# Patient Record
Sex: MALE | Race: OTHER
[De-identification: names, ages, dates, MRNs, and addresses within clinical notes are randomized per-mention and may not be internally consistent; named-entity substitution may affect disease eponyms.]

---

## 2022-02-20 ENCOUNTER — HOSPITAL ENCOUNTER (EMERGENCY)
Dept: HOSPITAL 15 - ER | Age: 67
Discharge: HOME | End: 2022-02-20
Payer: COMMERCIAL

## 2022-02-20 VITALS — DIASTOLIC BLOOD PRESSURE: 71 MMHG | SYSTOLIC BLOOD PRESSURE: 109 MMHG

## 2022-02-20 VITALS — BODY MASS INDEX: 24.11 KG/M2 | HEIGHT: 66 IN | WEIGHT: 150 LBS

## 2022-02-20 DIAGNOSIS — E78.5: ICD-10-CM

## 2022-02-20 DIAGNOSIS — R41.82: ICD-10-CM

## 2022-02-20 DIAGNOSIS — R55: Primary | ICD-10-CM

## 2022-02-20 DIAGNOSIS — I10: ICD-10-CM

## 2022-02-20 DIAGNOSIS — F17.210: ICD-10-CM

## 2022-02-20 DIAGNOSIS — R42: ICD-10-CM

## 2022-02-20 LAB
ALBUMIN SERPL-MCNC: 3.6 G/DL (ref 3.4–5)
ALP SERPL-CCNC: 63 U/L (ref 45–117)
ALT SERPL-CCNC: 28 U/L (ref 16–61)
ANION GAP SERPL CALCULATED.3IONS-SCNC: 5 MMOL/L (ref 5–15)
BILIRUB SERPL-MCNC: 0.6 MG/DL (ref 0.2–1)
BUN SERPL-MCNC: 20 MG/DL (ref 7–18)
BUN/CREAT SERPL: 18.2
CALCIUM SERPL-MCNC: 8.5 MG/DL (ref 8.5–10.1)
CHLORIDE SERPL-SCNC: 106 MMOL/L (ref 98–107)
CO2 SERPL-SCNC: 24 MMOL/L (ref 21–32)
GLUCOSE SERPL-MCNC: 90 MG/DL (ref 74–106)
HCT VFR BLD AUTO: 38.1 % (ref 41–53)
HGB BLD-MCNC: 13.3 G/DL (ref 13.5–17.5)
MCH RBC QN AUTO: 29.2 PG (ref 28–32)
MCV RBC AUTO: 83.6 FL (ref 80–100)
NRBC BLD QL AUTO: 0.1 %
POTASSIUM SERPL-SCNC: 4.1 MMOL/L (ref 3.5–5.1)
PROT SERPL-MCNC: 6.9 G/DL (ref 6.4–8.2)
SODIUM SERPL-SCNC: 135 MMOL/L (ref 136–145)

## 2022-02-20 PROCEDURE — 85025 COMPLETE CBC W/AUTO DIFF WBC: CPT

## 2022-02-20 PROCEDURE — 84484 ASSAY OF TROPONIN QUANT: CPT

## 2022-02-20 PROCEDURE — 93005 ELECTROCARDIOGRAM TRACING: CPT

## 2022-02-20 PROCEDURE — 96365 THER/PROPH/DIAG IV INF INIT: CPT

## 2022-02-20 PROCEDURE — 71045 X-RAY EXAM CHEST 1 VIEW: CPT

## 2022-02-20 PROCEDURE — 70450 CT HEAD/BRAIN W/O DYE: CPT

## 2022-02-20 PROCEDURE — 80053 COMPREHEN METABOLIC PANEL: CPT

## 2022-02-20 PROCEDURE — 99285 EMERGENCY DEPT VISIT HI MDM: CPT

## 2022-02-20 PROCEDURE — 74176 CT ABD & PELVIS W/O CONTRAST: CPT

## 2022-02-20 PROCEDURE — 96361 HYDRATE IV INFUSION ADD-ON: CPT

## 2022-02-20 PROCEDURE — 36415 COLL VENOUS BLD VENIPUNCTURE: CPT

## 2023-08-07 ENCOUNTER — HOSPITAL ENCOUNTER (INPATIENT)
Dept: HOSPITAL 15 - ER | Age: 68
LOS: 3 days | Discharge: HOME | DRG: 174 | End: 2023-08-10
Admitting: INTERNAL MEDICINE
Payer: MEDICAID

## 2023-08-07 VITALS — HEIGHT: 67 IN | BODY MASS INDEX: 28.48 KG/M2 | WEIGHT: 181.44 LBS

## 2023-08-07 VITALS — RESPIRATION RATE: 16 BRPM | HEART RATE: 64 BPM | OXYGEN SATURATION: 97 %

## 2023-08-07 DIAGNOSIS — J84.10: ICD-10-CM

## 2023-08-07 DIAGNOSIS — I21.4: Primary | ICD-10-CM

## 2023-08-07 DIAGNOSIS — I11.0: ICD-10-CM

## 2023-08-07 DIAGNOSIS — Z95.5: ICD-10-CM

## 2023-08-07 DIAGNOSIS — Z87.891: ICD-10-CM

## 2023-08-07 DIAGNOSIS — I50.30: ICD-10-CM

## 2023-08-07 DIAGNOSIS — Z90.49: ICD-10-CM

## 2023-08-07 DIAGNOSIS — I25.10: ICD-10-CM

## 2023-08-07 DIAGNOSIS — E78.5: ICD-10-CM

## 2023-08-07 LAB
ALBUMIN SERPL-MCNC: 3.8 G/DL (ref 3.4–5)
ALP SERPL-CCNC: 74 U/L (ref 45–117)
ALT SERPL-CCNC: 39 U/L (ref 16–61)
ANION GAP SERPL CALCULATED.3IONS-SCNC: 5 MMOL/L (ref 5–15)
APTT PPP: 29.3 SEC (ref 24.5–34.5)
BILIRUB SERPL-MCNC: 0.5 MG/DL (ref 0.2–1)
BUN SERPL-MCNC: 19 MG/DL (ref 7–18)
BUN/CREAT SERPL: 16.5 (ref 10–20)
CALCIUM SERPL-MCNC: 9 MG/DL (ref 8.5–10.1)
CHLORIDE SERPL-SCNC: 107 MMOL/L (ref 98–107)
CHOLEST SERPL-MCNC: 200 MG/DL (ref ?–200)
CO2 SERPL-SCNC: 26 MMOL/L (ref 21–32)
GLUCOSE SERPL-MCNC: 97 MG/DL (ref 74–106)
HCT VFR BLD AUTO: 42.4 % (ref 41–53)
HDLC SERPL-MCNC: 44 MG/DL (ref 40–59)
HGB BLD-MCNC: 14.4 G/DL (ref 13.5–17.5)
INR PPP: 1.06 (ref 0.9–1.15)
MCH RBC QN AUTO: 28.5 PG (ref 28–32)
MCV RBC AUTO: 83.7 FL (ref 80–100)
NRBC BLD QL AUTO: 0 %
POTASSIUM SERPL-SCNC: 4.2 MMOL/L (ref 3.5–5.1)
PROT SERPL-MCNC: 7.5 G/DL (ref 6.4–8.2)
PROTHROMBIN TIME: 11.1 SEC (ref 9.3–11.8)
SODIUM SERPL-SCNC: 138 MMOL/L (ref 136–145)
TRIGL SERPL-MCNC: 167 MG/DL (ref ?–150)

## 2023-08-07 PROCEDURE — 36415 COLL VENOUS BLD VENIPUNCTURE: CPT

## 2023-08-07 PROCEDURE — 84484 ASSAY OF TROPONIN QUANT: CPT

## 2023-08-07 PROCEDURE — 85025 COMPLETE CBC W/AUTO DIFF WBC: CPT

## 2023-08-07 PROCEDURE — 80061 LIPID PANEL: CPT

## 2023-08-07 PROCEDURE — 80053 COMPREHEN METABOLIC PANEL: CPT

## 2023-08-07 PROCEDURE — 96372 THER/PROPH/DIAG INJ SC/IM: CPT

## 2023-08-07 PROCEDURE — 71045 X-RAY EXAM CHEST 1 VIEW: CPT

## 2023-08-07 PROCEDURE — 85610 PROTHROMBIN TIME: CPT

## 2023-08-07 PROCEDURE — 93306 TTE W/DOPPLER COMPLETE: CPT

## 2023-08-07 PROCEDURE — 81001 URINALYSIS AUTO W/SCOPE: CPT

## 2023-08-07 PROCEDURE — 99291 CRITICAL CARE FIRST HOUR: CPT

## 2023-08-07 PROCEDURE — 83880 ASSAY OF NATRIURETIC PEPTIDE: CPT

## 2023-08-07 PROCEDURE — 85730 THROMBOPLASTIN TIME PARTIAL: CPT

## 2023-08-07 PROCEDURE — 83036 HEMOGLOBIN GLYCOSYLATED A1C: CPT

## 2023-08-07 PROCEDURE — 99152 MOD SED SAME PHYS/QHP 5/>YRS: CPT

## 2023-08-07 PROCEDURE — 83735 ASSAY OF MAGNESIUM: CPT

## 2023-08-07 PROCEDURE — 84443 ASSAY THYROID STIM HORMONE: CPT

## 2023-08-07 PROCEDURE — 93005 ELECTROCARDIOGRAM TRACING: CPT

## 2023-08-07 PROCEDURE — 80048 BASIC METABOLIC PNL TOTAL CA: CPT

## 2023-08-07 RX ADMIN — ENOXAPARIN SODIUM SCH MG: 100 INJECTION SUBCUTANEOUS at 20:00

## 2023-08-07 RX ADMIN — SODIUM CHLORIDE SCH MLS/HR: 0.9 INJECTION, SOLUTION INTRAVENOUS at 20:28

## 2023-08-08 VITALS
DIASTOLIC BLOOD PRESSURE: 84 MMHG | SYSTOLIC BLOOD PRESSURE: 148 MMHG | OXYGEN SATURATION: 99 % | RESPIRATION RATE: 18 BRPM | HEART RATE: 68 BPM

## 2023-08-08 VITALS
RESPIRATION RATE: 13 BRPM | OXYGEN SATURATION: 95 % | HEART RATE: 68 BPM | DIASTOLIC BLOOD PRESSURE: 86 MMHG | SYSTOLIC BLOOD PRESSURE: 155 MMHG

## 2023-08-08 VITALS
OXYGEN SATURATION: 96 % | HEART RATE: 70 BPM | SYSTOLIC BLOOD PRESSURE: 129 MMHG | DIASTOLIC BLOOD PRESSURE: 75 MMHG | RESPIRATION RATE: 20 BRPM

## 2023-08-08 VITALS
SYSTOLIC BLOOD PRESSURE: 113 MMHG | HEART RATE: 70 BPM | DIASTOLIC BLOOD PRESSURE: 60 MMHG | OXYGEN SATURATION: 99 % | RESPIRATION RATE: 17 BRPM | TEMPERATURE: 98 F

## 2023-08-08 VITALS
HEART RATE: 67 BPM | RESPIRATION RATE: 15 BRPM | SYSTOLIC BLOOD PRESSURE: 141 MMHG | OXYGEN SATURATION: 96 % | DIASTOLIC BLOOD PRESSURE: 78 MMHG

## 2023-08-08 VITALS
HEART RATE: 65 BPM | TEMPERATURE: 97.8 F | OXYGEN SATURATION: 95 % | RESPIRATION RATE: 12 BRPM | SYSTOLIC BLOOD PRESSURE: 150 MMHG | DIASTOLIC BLOOD PRESSURE: 81 MMHG

## 2023-08-08 VITALS — HEART RATE: 69 BPM | OXYGEN SATURATION: 98 % | RESPIRATION RATE: 18 BRPM

## 2023-08-08 VITALS
DIASTOLIC BLOOD PRESSURE: 88 MMHG | HEART RATE: 66 BPM | SYSTOLIC BLOOD PRESSURE: 160 MMHG | RESPIRATION RATE: 12 BRPM | OXYGEN SATURATION: 95 %

## 2023-08-08 VITALS — HEART RATE: 71 BPM | RESPIRATION RATE: 16 BRPM | OXYGEN SATURATION: 93 %

## 2023-08-08 LAB
ALBUMIN SERPL-MCNC: 3.3 G/DL (ref 3.4–5)
ALP SERPL-CCNC: 64 U/L (ref 45–117)
ALT SERPL-CCNC: 35 U/L (ref 16–61)
ANION GAP SERPL CALCULATED.3IONS-SCNC: 5 MMOL/L (ref 5–15)
BILIRUB SERPL-MCNC: 0.4 MG/DL (ref 0.2–1)
BUN SERPL-MCNC: 21 MG/DL (ref 7–18)
BUN/CREAT SERPL: 18.4 (ref 10–20)
CALCIUM SERPL-MCNC: 8.7 MG/DL (ref 8.5–10.1)
CHLORIDE SERPL-SCNC: 108 MMOL/L (ref 98–107)
CO2 SERPL-SCNC: 25 MMOL/L (ref 21–32)
GLUCOSE SERPL-MCNC: 94 MG/DL (ref 74–106)
HCT VFR BLD AUTO: 40 % (ref 41–53)
HGB BLD-MCNC: 13.7 G/DL (ref 13.5–17.5)
MCH RBC QN AUTO: 28.6 PG (ref 28–32)
MCV RBC AUTO: 83.5 FL (ref 80–100)
NRBC BLD QL AUTO: 0.1 %
POTASSIUM SERPL-SCNC: 4 MMOL/L (ref 3.5–5.1)
PROT SERPL-MCNC: 7.1 G/DL (ref 6.4–8.2)
SODIUM SERPL-SCNC: 138 MMOL/L (ref 136–145)

## 2023-08-08 PROCEDURE — B240ZZ3 ULTRASONOGRAPHY OF SINGLE CORONARY ARTERY, INTRAVASCULAR: ICD-10-PCS | Performed by: STUDENT IN AN ORGANIZED HEALTH CARE EDUCATION/TRAINING PROGRAM

## 2023-08-08 PROCEDURE — B211YZZ FLUOROSCOPY OF MULTIPLE CORONARY ARTERIES USING OTHER CONTRAST: ICD-10-PCS | Performed by: STUDENT IN AN ORGANIZED HEALTH CARE EDUCATION/TRAINING PROGRAM

## 2023-08-08 PROCEDURE — 027034Z DILATION OF CORONARY ARTERY, ONE ARTERY WITH DRUG-ELUTING INTRALUMINAL DEVICE, PERCUTANEOUS APPROACH: ICD-10-PCS | Performed by: STUDENT IN AN ORGANIZED HEALTH CARE EDUCATION/TRAINING PROGRAM

## 2023-08-08 PROCEDURE — 4A023N7 MEASUREMENT OF CARDIAC SAMPLING AND PRESSURE, LEFT HEART, PERCUTANEOUS APPROACH: ICD-10-PCS | Performed by: STUDENT IN AN ORGANIZED HEALTH CARE EDUCATION/TRAINING PROGRAM

## 2023-08-08 PROCEDURE — 4A033BC MEASUREMENT OF ARTERIAL PRESSURE, CORONARY, PERCUTANEOUS APPROACH: ICD-10-PCS | Performed by: STUDENT IN AN ORGANIZED HEALTH CARE EDUCATION/TRAINING PROGRAM

## 2023-08-08 PROCEDURE — B215YZZ FLUOROSCOPY OF LEFT HEART USING OTHER CONTRAST: ICD-10-PCS | Performed by: STUDENT IN AN ORGANIZED HEALTH CARE EDUCATION/TRAINING PROGRAM

## 2023-08-08 RX ADMIN — SODIUM CHLORIDE SCH MLS/HR: 0.9 INJECTION, SOLUTION INTRAVENOUS at 21:20

## 2023-08-08 RX ADMIN — TICAGRELOR SCH MG: 90 TABLET ORAL at 21:18

## 2023-08-08 RX ADMIN — ATORVASTATIN CALCIUM SCH MG: 20 TABLET, FILM COATED ORAL at 21:18

## 2023-08-08 RX ADMIN — SODIUM CHLORIDE SCH MLS/HR: 0.9 INJECTION, SOLUTION INTRAVENOUS at 08:27

## 2023-08-08 RX ADMIN — ENOXAPARIN SODIUM SCH MG: 100 INJECTION SUBCUTANEOUS at 09:02

## 2023-08-09 VITALS
HEART RATE: 77 BPM | SYSTOLIC BLOOD PRESSURE: 119 MMHG | TEMPERATURE: 98 F | OXYGEN SATURATION: 92 % | RESPIRATION RATE: 16 BRPM | DIASTOLIC BLOOD PRESSURE: 68 MMHG

## 2023-08-09 VITALS — RESPIRATION RATE: 18 BRPM | HEART RATE: 77 BPM | OXYGEN SATURATION: 97 %

## 2023-08-09 VITALS
OXYGEN SATURATION: 97 % | HEART RATE: 77 BPM | RESPIRATION RATE: 19 BRPM | TEMPERATURE: 97.8 F | SYSTOLIC BLOOD PRESSURE: 112 MMHG | DIASTOLIC BLOOD PRESSURE: 67 MMHG

## 2023-08-09 VITALS
SYSTOLIC BLOOD PRESSURE: 119 MMHG | TEMPERATURE: 98 F | OXYGEN SATURATION: 92 % | HEART RATE: 77 BPM | DIASTOLIC BLOOD PRESSURE: 68 MMHG | RESPIRATION RATE: 16 BRPM

## 2023-08-09 VITALS
SYSTOLIC BLOOD PRESSURE: 118 MMHG | RESPIRATION RATE: 17 BRPM | OXYGEN SATURATION: 99 % | HEART RATE: 73 BPM | DIASTOLIC BLOOD PRESSURE: 71 MMHG | TEMPERATURE: 98.1 F

## 2023-08-09 VITALS — OXYGEN SATURATION: 97 % | HEART RATE: 73 BPM | RESPIRATION RATE: 18 BRPM

## 2023-08-09 LAB
ANION GAP SERPL CALCULATED.3IONS-SCNC: 5 MMOL/L (ref 5–15)
BUN SERPL-MCNC: 16 MG/DL (ref 7–18)
BUN/CREAT SERPL: 14.5 (ref 10–20)
CALCIUM SERPL-MCNC: 8.2 MG/DL (ref 8.5–10.1)
CHLORIDE SERPL-SCNC: 108 MMOL/L (ref 98–107)
CO2 SERPL-SCNC: 26 MMOL/L (ref 21–32)
GLUCOSE SERPL-MCNC: 80 MG/DL (ref 74–106)
HCT VFR BLD AUTO: 38.2 % (ref 41–53)
HGB BLD-MCNC: 12.9 G/DL (ref 13.5–17.5)
MCH RBC QN AUTO: 28.6 PG (ref 28–32)
MCV RBC AUTO: 84.8 FL (ref 80–100)
NRBC BLD QL AUTO: 0 %
POTASSIUM SERPL-SCNC: 4 MMOL/L (ref 3.5–5.1)
SODIUM SERPL-SCNC: 139 MMOL/L (ref 136–145)

## 2023-08-09 RX ADMIN — TICAGRELOR SCH MG: 90 TABLET ORAL at 10:11

## 2023-08-09 RX ADMIN — ACETAMINOPHEN PRN MG: 325 TABLET ORAL at 21:37

## 2023-08-09 RX ADMIN — ACETAMINOPHEN PRN MG: 325 TABLET ORAL at 12:14

## 2023-08-09 RX ADMIN — METOPROLOL SUCCINATE SCH MG: 50 TABLET, EXTENDED RELEASE ORAL at 10:07

## 2023-08-09 RX ADMIN — TICAGRELOR SCH MG: 90 TABLET ORAL at 21:37

## 2023-08-09 RX ADMIN — ATORVASTATIN CALCIUM SCH MG: 20 TABLET, FILM COATED ORAL at 21:37

## 2023-08-10 VITALS
RESPIRATION RATE: 19 BRPM | TEMPERATURE: 98 F | HEART RATE: 73 BPM | OXYGEN SATURATION: 94 % | DIASTOLIC BLOOD PRESSURE: 72 MMHG | SYSTOLIC BLOOD PRESSURE: 123 MMHG

## 2023-08-10 VITALS
RESPIRATION RATE: 18 BRPM | DIASTOLIC BLOOD PRESSURE: 69 MMHG | TEMPERATURE: 97.9 F | SYSTOLIC BLOOD PRESSURE: 120 MMHG | OXYGEN SATURATION: 96 % | HEART RATE: 74 BPM

## 2023-08-10 VITALS — RESPIRATION RATE: 19 BRPM | HEART RATE: 73 BPM | OXYGEN SATURATION: 94 %

## 2023-08-10 RX ADMIN — METOPROLOL SUCCINATE SCH MG: 50 TABLET, EXTENDED RELEASE ORAL at 09:59

## 2023-08-10 RX ADMIN — TICAGRELOR SCH MG: 90 TABLET ORAL at 09:59

## 2023-09-07 ENCOUNTER — HOSPITAL ENCOUNTER (INPATIENT)
Dept: HOSPITAL 15 - ER | Age: 68
Discharge: LEFT BEFORE DISCHARGE | DRG: 198 | End: 2023-09-07
Payer: MEDICAID

## 2023-09-07 VITALS — WEIGHT: 165.35 LBS | BODY MASS INDEX: 24.49 KG/M2 | HEIGHT: 69 IN

## 2023-09-07 VITALS
OXYGEN SATURATION: 97 % | RESPIRATION RATE: 16 BRPM | SYSTOLIC BLOOD PRESSURE: 123 MMHG | DIASTOLIC BLOOD PRESSURE: 81 MMHG | HEART RATE: 71 BPM

## 2023-09-07 DIAGNOSIS — Z87.891: ICD-10-CM

## 2023-09-07 DIAGNOSIS — N40.0: ICD-10-CM

## 2023-09-07 DIAGNOSIS — I24.9: Primary | ICD-10-CM

## 2023-09-07 DIAGNOSIS — Z95.5: ICD-10-CM

## 2023-09-07 DIAGNOSIS — I10: ICD-10-CM

## 2023-09-07 DIAGNOSIS — E78.5: ICD-10-CM

## 2023-09-07 LAB
ALBUMIN SERPL-MCNC: 4.6 G/DL (ref 3.2–4.8)
ALP SERPL-CCNC: 81 U/L (ref 46–116)
ALT SERPL-CCNC: 34 U/L (ref 7–40)
ANION GAP SERPL CALCULATED.3IONS-SCNC: 7 MMOL/L (ref 5–15)
BILIRUB SERPL-MCNC: 0.4 MG/DL (ref 0.2–1)
BUN SERPL-MCNC: 13 MG/DL (ref 9–23)
BUN/CREAT SERPL: 12.7 (ref 10–20)
CALCIUM SERPL-MCNC: 9.9 MG/DL (ref 8.5–10.1)
CHLORIDE SERPL-SCNC: 105 MMOL/L (ref 98–107)
CO2 SERPL-SCNC: 26 MMOL/L (ref 20–30)
GLUCOSE SERPL-MCNC: 115 MG/DL (ref 74–106)
HCT VFR BLD AUTO: 41.2 % (ref 41–53)
HGB BLD-MCNC: 14.1 G/DL (ref 13.5–17.5)
MCH RBC QN AUTO: 28.4 PG (ref 28–32)
MCV RBC AUTO: 83.3 FL (ref 80–100)
NRBC BLD QL AUTO: 0 %
POTASSIUM SERPL-SCNC: 3.7 MMOL/L (ref 3.5–5.1)
PROT SERPL-MCNC: 7.7 G/DL (ref 5.7–8.2)
SODIUM SERPL-SCNC: 138 MMOL/L (ref 136–145)

## 2023-09-07 PROCEDURE — 80053 COMPREHEN METABOLIC PANEL: CPT

## 2023-09-07 PROCEDURE — 84484 ASSAY OF TROPONIN QUANT: CPT

## 2023-09-07 PROCEDURE — 36415 COLL VENOUS BLD VENIPUNCTURE: CPT

## 2023-09-07 PROCEDURE — 85025 COMPLETE CBC W/AUTO DIFF WBC: CPT

## 2023-09-16 ENCOUNTER — HOSPITAL ENCOUNTER (EMERGENCY)
Dept: HOSPITAL 15 - ER | Age: 68
Discharge: HOME | End: 2023-09-16
Payer: MEDICAID

## 2023-09-16 VITALS
OXYGEN SATURATION: 97 % | RESPIRATION RATE: 15 BRPM | HEART RATE: 69 BPM | SYSTOLIC BLOOD PRESSURE: 126 MMHG | DIASTOLIC BLOOD PRESSURE: 71 MMHG

## 2023-09-16 VITALS — BODY MASS INDEX: 24.13 KG/M2 | HEIGHT: 66 IN | WEIGHT: 150.13 LBS

## 2023-09-16 VITALS — RESPIRATION RATE: 17 BRPM | OXYGEN SATURATION: 96 % | HEART RATE: 69 BPM

## 2023-09-16 VITALS — TEMPERATURE: 98.4 F

## 2023-09-16 DIAGNOSIS — Z87.891: ICD-10-CM

## 2023-09-16 DIAGNOSIS — I25.2: ICD-10-CM

## 2023-09-16 DIAGNOSIS — I10: ICD-10-CM

## 2023-09-16 DIAGNOSIS — I25.10: ICD-10-CM

## 2023-09-16 DIAGNOSIS — Z98.890: ICD-10-CM

## 2023-09-16 DIAGNOSIS — E78.5: ICD-10-CM

## 2023-09-16 DIAGNOSIS — R42: Primary | ICD-10-CM

## 2023-09-16 LAB
ALBUMIN SERPL-MCNC: 4.4 G/DL (ref 3.2–4.8)
ALP SERPL-CCNC: 79 U/L (ref 46–116)
ALT SERPL-CCNC: 37 U/L (ref 7–40)
ANION GAP SERPL CALCULATED.3IONS-SCNC: 9 MMOL/L (ref 5–15)
APTT PPP: 27.6 SEC (ref 24.5–34.5)
BILIRUB SERPL-MCNC: 0.7 MG/DL (ref 0.2–1)
BUN SERPL-MCNC: 21 MG/DL (ref 9–23)
BUN/CREAT SERPL: 20.4 (ref 10–20)
CALCIUM SERPL-MCNC: 9.4 MG/DL (ref 8.7–10.4)
CHLORIDE SERPL-SCNC: 104 MMOL/L (ref 98–107)
CO2 SERPL-SCNC: 24 MMOL/L (ref 20–30)
GLUCOSE SERPL-MCNC: 117 MG/DL (ref 74–106)
HCT VFR BLD AUTO: 38.6 % (ref 41–53)
HGB BLD-MCNC: 13.3 G/DL (ref 13.5–17.5)
INR PPP: 1.04 (ref 0.9–1.15)
MAGNESIUM SERPL-MCNC: 1.8 MG/DL (ref 1.6–2.6)
MCH RBC QN AUTO: 28.5 PG (ref 28–32)
MCV RBC AUTO: 82.9 FL (ref 80–100)
NRBC BLD QL AUTO: 0 %
POTASSIUM SERPL-SCNC: 3.5 MMOL/L (ref 3.5–5.1)
PROT SERPL-MCNC: 7 G/DL (ref 5.7–8.2)
PROTHROMBIN TIME: 10.9 SEC (ref 9.3–11.8)
SODIUM SERPL-SCNC: 137 MMOL/L (ref 136–145)

## 2023-09-16 PROCEDURE — 85025 COMPLETE CBC W/AUTO DIFF WBC: CPT

## 2023-09-16 PROCEDURE — 84484 ASSAY OF TROPONIN QUANT: CPT

## 2023-09-16 PROCEDURE — 70450 CT HEAD/BRAIN W/O DYE: CPT

## 2023-09-16 PROCEDURE — 36415 COLL VENOUS BLD VENIPUNCTURE: CPT

## 2023-09-16 PROCEDURE — 83735 ASSAY OF MAGNESIUM: CPT

## 2023-09-16 PROCEDURE — 83880 ASSAY OF NATRIURETIC PEPTIDE: CPT

## 2023-09-16 PROCEDURE — 80053 COMPREHEN METABOLIC PANEL: CPT

## 2023-09-16 PROCEDURE — 71045 X-RAY EXAM CHEST 1 VIEW: CPT

## 2023-09-16 PROCEDURE — 85730 THROMBOPLASTIN TIME PARTIAL: CPT

## 2023-09-16 PROCEDURE — 93005 ELECTROCARDIOGRAM TRACING: CPT

## 2023-09-16 PROCEDURE — 85610 PROTHROMBIN TIME: CPT

## 2023-09-16 PROCEDURE — 99285 EMERGENCY DEPT VISIT HI MDM: CPT

## 2025-02-11 ENCOUNTER — HOSPITAL ENCOUNTER (EMERGENCY)
Dept: HOSPITAL 15 - ER | Age: 70
Discharge: LEFT BEFORE DISCHARGE | End: 2025-02-11
Payer: MEDICAID

## 2025-02-11 VITALS
SYSTOLIC BLOOD PRESSURE: 148 MMHG | OXYGEN SATURATION: 97 % | DIASTOLIC BLOOD PRESSURE: 76 MMHG | RESPIRATION RATE: 20 BRPM

## 2025-02-11 VITALS — HEIGHT: 67 IN | WEIGHT: 162.48 LBS | BODY MASS INDEX: 25.5 KG/M2

## 2025-02-11 VITALS — HEART RATE: 67 BPM

## 2025-02-11 DIAGNOSIS — I10: ICD-10-CM

## 2025-02-11 DIAGNOSIS — Z98.890: ICD-10-CM

## 2025-02-11 DIAGNOSIS — I24.9: Primary | ICD-10-CM

## 2025-02-11 DIAGNOSIS — Z90.49: ICD-10-CM

## 2025-02-11 DIAGNOSIS — E78.5: ICD-10-CM

## 2025-02-11 DIAGNOSIS — Z79.82: ICD-10-CM

## 2025-02-11 DIAGNOSIS — I25.10: ICD-10-CM

## 2025-02-11 DIAGNOSIS — Z95.1: ICD-10-CM

## 2025-02-11 DIAGNOSIS — Z79.899: ICD-10-CM

## 2025-02-11 LAB
ANION GAP SERPL CALCULATED.3IONS-SCNC: 7 MMOL/L (ref 5–15)
BUN SERPL-MCNC: 16 MG/DL (ref 9–23)
BUN/CREAT SERPL: 15.4 (ref 10–20)
CALCIUM SERPL-MCNC: 10.7 MG/DL (ref 8.7–10.4)
CHLORIDE SERPL-SCNC: 102 MMOL/L (ref 98–107)
CO2 SERPL-SCNC: 29 MMOL/L (ref 20–31)
GLUCOSE SERPL-MCNC: 101 MG/DL (ref 74–106)
HCT VFR BLD AUTO: 42.3 % (ref 41–53)
HGB BLD-MCNC: 14.5 G/DL (ref 13.5–17.5)
MCH RBC QN AUTO: 28.8 PG (ref 28–32)
MCV RBC AUTO: 83.9 FL (ref 80–100)
NRBC BLD QL AUTO: 0.1 %
POTASSIUM SERPL-SCNC: 4.1 MMOL/L (ref 3.5–5.1)
SODIUM SERPL-SCNC: 138 MMOL/L (ref 136–145)

## 2025-02-11 PROCEDURE — 71046 X-RAY EXAM CHEST 2 VIEWS: CPT

## 2025-02-11 PROCEDURE — 84484 ASSAY OF TROPONIN QUANT: CPT

## 2025-02-11 PROCEDURE — 85025 COMPLETE CBC W/AUTO DIFF WBC: CPT

## 2025-02-11 PROCEDURE — 80048 BASIC METABOLIC PNL TOTAL CA: CPT

## 2025-02-11 PROCEDURE — 93005 ELECTROCARDIOGRAM TRACING: CPT

## 2025-02-11 PROCEDURE — 36415 COLL VENOUS BLD VENIPUNCTURE: CPT

## 2025-02-11 NOTE — ED.PDOC
HPI Comments


This is a 69-year-old male who comes in with chief complaint of right-sided 

chest pain with dizziness.  The patient states that he was at home and the chest

pain started approximately 1 hour prior to arrival.  The patient was also 

complaining of some dizziness.  The patient states that the pain is a 2/10.  The

pain is described as heaviness in the chest.  There has been no shortness a 

breath but the patient was also having some nausea.  Upon arrival, the patient's

blood pressure was within normal limits.  The patient denies any history of 

chest pain in the past.


Chief Complaint:  Chest Pain


Time Seen by MD:  12:22


Primary Care Provider:  [facility]


Reviewed Notes:  Nurses Notes, Medications, Allergies (No allergies to 

medications)


Allergies:  


Coded Allergies:  


     NO KNOWN ALLERGIES (Unverified , 3/22/12)


Home Meds


Active Scripts


Meclizine HCl (Meclizine 25) 25 Mg Tab, 25 MG PO DAILY for 10 Days, #10 TAB


   Prov:BERNARDO RAMIREZ MD         9/16/23


Nitroglycerin (NTROSTAT SUBLINGUAL) 0.4 Mg Sl, 0.4 MG SL Q5MINP PRN, #30 TAB


   Prov:RUTH LANDRY MD         8/9/23


Atorvastatin Calcium (Lipitor) 80 Mg Tab, 1 TAB PO DAILY, #30 TAB 5 Refills


   Prov:RUTH LANDRY MD         8/9/23


Metoprolol Succinate (Toprol Xl) 25 Mg Tab, 1 TAB PO DAILY, #30 TAB 5 Refills


   Prov:RUTH LANDRY MD         8/9/23


Aspirin (Aspirin Low Dose) 81 Mg Tab, 81 MG PO DAILY, #30 TAB 5 Refills


   Prov:RUTH LANDRY MD         8/9/23


Ticagrelor Base (BRILINTA) 90 Mg Tab, 90 MG PO BID, #60 TAB 5 Refills


   Prov:RUTH LANDRY MD         8/9/23


Reported Medications


[Vrufbazelkp03 Mg] (Simvastatin) 20 MG TAB No Conflict Check, MG


   7/2/13


Information Source:  Patient


Mode of Arrival:  Ambulatory


Severity:  Moderate


Timing:  Hours


Duration:  Since onset


Prehospital treatment:  None


Location:  Chest (R)


Radiation:  No Radiation


Quality:  Squeezing, Pressure


Onset:  At Rest


Cardiac Risk Factors:  Hyperlipidemia, Other (Cardiac disease)


PE Risk Factors:  None


History of:  Similar pain in past, Angina


Modifying Factors:  Nothing


Associated Signs and Symptoms:  N/V (Nausea but no vomiting)





Past Medical History


PAST MEDICAL HISTORY:  CAD, High Lipids, HTN, MI


Surgical History:  Appendectomy, CABG, PTCA





Family History


Family History:  Reviewed,noncontributory to illness





Social History


Smoker:  Quit Greater Than 1 Year, Cigarettes


Alcohol:  Denies ETOH Use


Drugs:  Denies Drug Use


Lives In:  Home





Constitutional:  reports: weakness; denies: chills, diaphoresis, fatigue, fever,

malaise, sweats, others


EENTM:  denies: blurred vision, double vision, ear bleeding, ear discharge, ear 

drainage, ear pain, ear ringing, eye pain, eye redness, hearing loss, mouth 

pain, mouth swelling, nasal discharge, nose bleeding, nose congestion, nose 

pain, photophobia, tearing, throat pain, throat swelling, voice changes, others


Respiratory:  denies: cough, hemoptysis, orthopnea, SOB at rest, shortness of b

reath, SOB with excertion, stridor, wheezing, others


Cardiovascular:  reports: chest pain; denies: dizzy spells, diaphoresis, Dyspnea

on exertion, edema, irregular heart beat, left arm pain, lightheadedness, palpit

ations, PND, syncope, others


Gastrointestinal:  denies: abdomen distended, abdominal pain, blood streaked b

owels, constipated, diarrhea, dysphagia, difficulty swallowing, hematemesis, 

melena, nausea, poor appetite, poor fluid intake, rectal bleeding, rectal pain, 

vomiting, others


Genitourinary:  denies: burning, dysuria, flank pain, frequency, hematuria, 

incontinence, penile discharge, penile sore, pain, testicle pain, testicle 

swelling, urgency, others


Neurological:  reports: dizziness; denies: fainting, headache, left sided 

numbness, left sided weakness, numbness, paresthesia, pre-existing deficit, 

right sided numbness, right sided weakness, seizure, speech problems, tingling, 

tremors, weakness, others


Musculoskeletal:  denies: back pain, gout, joint pain, joint swelling, muscle 

pain, muscle stiffness, neck pain, others


Integumetry:  denies: bruises, change in color, change in hair/nails, dryness, 

laceration, lesions, lumps, rash, wounds, others


Allergic/Immunocompromised:  denies: Difficulty Healing, Frequent Infections, 

Hives, Itching, others


Hematologic/Lymphatic:  denies: anemia, blood clots, easy bleeding, easy 

bruising, swollen glands, others


Endocrine:  denies: excessive hunger, excessive sweating, excessive thirst, 

excessive urination, flushing, intolerance to cold, intolerance to heat, 

unexplained weight gain, unexplained weight loss, others


Psychiatric:  denies: anxiety, bipolar disorder, depression, hopeless, panic di

sorder, schizophrenia, sleepless, suicidal, others





Physical Exam


General Appearance:  Moderate Distress


HEENT:  Normal ENT Inspection, Pharynx Normal, TMs Normal


Neck:  Full Range of Motion, Non-Tender, Normal, Normal Inspection


Respiratory:  Chest Non-Tender, Lungs Clear, No Accessory Muscle Use, No 

Respiratory Distress, Normal Breath Sounds


Cardiovascular:  No Edema, No JVD, No Murmur, No Gallop, Normal Peripheral 

Pulses, Regular Rate/Rhythm


Breast Exam:  Deferred


Gastrointestinal:  No Organomegaly, Non Tender, No Pulsatile Mass, Normal Bowel 

Sounds, Soft


Genitalia:  Deferred


Pelvic:  Deferred


Rectal:  Deferred


Extremities:  No calf tenderness, Normal capillary refill, Normal inspection, 

Normal range of motion, Non-tender, No pedal edema


Musculoskeletal :  


   Apperance:  Normal


Neurologic:  Alert, CNs II-XII nml as Tested, No Motor Deficits, Normal Affect, 

Normal Mood, No Sensory Deficits


Cerebellar Function:  Normal


Reflexes:  Normal


Skin:  Dry, Normal Color, Warm


Lymphatic:  No Adenopathy





EKG


EKG :  


   Pulse Rate (adult):  67


   Axis:  Normal


   Cardiac Rhythm:  NSR


   Block:  None


   ST:  Nonsp





Was a procedure done?


Was a procedure done?:  No





CP Differential Dx


Differential Diagnosis:  Angina, MI, Pulmonary Embolus


Differential Diagnosis:  CHF


Differential Diagnosis:  Pericarditis





X-Ray, Labs, Meds, VS





                                   Vital Signs








  Date Time  Temp Pulse Resp B/P (MAP) Pulse Ox O2 Delivery O2 Flow Rate FiO2


 


2/11/25 13:21  67      


 


2/11/25 13:15  67      


 


2/11/25 12:24  66      


 


2/11/25 12:16 97.5 63 20 148/76 (100) 97   








                                       Lab








Test


 2/11/25


13:20 2/11/25


12:30 Range/Units


 


 


Troponin I High Sensitivity Pending  3 L </=54  ng/L


 


White Blood Count


 


 7.8 


 4.4-10.8


10^3/uL


 


Red Blood Count


 


 5.04 


 4.5-5.90


10^6/uL


 


Hemoglobin  14.5  13.5-17.5  g/dL


 


Hematocrit  42.3  41.0-53.0  %


 


Mean Corpuscular Volume  83.9  80.0-100.0  fL


 


Mean Corpuscular Hemoglobin  28.8  28.0-32.0  pg


 


Mean Corpuscular Hemoglobin


Concent 


 34.3 


 32.0-36.0  g/dL





 


Red Cell Distribution Width  13.8  11.8-14.3  %


 


Platelet Count


 


 293 


 140-450


10^3/uL


 


Mean Platelet Volume  7.3  6.9-10.8  fL


 


Neutrophils (%) (Auto)  59.8  37.0-80.0  %


 


Lymphocytes (%) (Auto)  26.7  10.0-50.0  %


 


Monocytes (%) (Auto)  8.5  0.0-12.0  %


 


Eosinophils (%) (Auto)  4.6  0.0-7.0  %


 


Basophils (%) (Auto)  0.4  0.0-2.0  %


 


Neutrophils # (Auto)


 


 4.7 


 1.6-8.6  10


^3/uL


 


Lymphocytes # (Auto)


 


 2.1 


 0.4-5.4  10


^3/uL


 


Monocytes # (Auto)  0.7  0-1.3  10 ^3/uL


 


Eosinophils # (Auto)  0.4  0-0.8  10 ^3/uL


 


Basophils # (Auto)  0  0-0.2  10 ^3/uL


 


Nucleated Red Blood Cells  0.1   %


 


Sodium Level  138  136-145  mmol/L


 


Potassium Level  4.1  3.5-5.1  mmol/L


 


Chloride Level  102    mmol/L


 


Carbon Dioxide Level  29  20-31  mmol/L


 


Anion Gap  7  5-15  


 


Blood Urea Nitrogen  16  9-23  mg/dL


 


Creatinine


 


 1.04 


 0.700-1.30


mg/dL


 


Glomerular Filtration Rate


Calc 


 79 


 >90  mL/min





 


BUN/Creatinine Ratio  15.4  10.0-20.0  


 


Serum Glucose  101    mg/dL


 


Calcium Level  10.7 H 8.7-10.4  mg/dL





IV Hep-Lock was established 


The CBC is within normal limits 


The patient was given aspirin here in the emergency department's


The 1st troponin level came back negative 


The patient was chemistry panel and CBC is within normal limits 


At this time, the patient was being admitted to the hospitalist 


A cardiology consult will be obtained.  


There is a concern because of the patient's chest pain with a history of 

Coronary artery disease and stent placement nine we discussed the findings with 

the patient and he is in agreement with the management.


Images Reviewed?:  Images reviewed and evaluated by me


Time of 1ST Reevaluation:  13:20


Reevaluation 1ST:  Unchanged


Patient Education/Counseling:  Diagnosis, Treatment, Prognosis


Family Education/Counseling:  No Family Present





Departure 1


Departure


Time of Disposition:  14:08


Impression:  


   Primary Impression:  


   ACS (acute coronary syndrome)


Disposition:  09 ADMITTED AS INPATIENT


Admit to:  Tele


Condition:  Fair





Critical Care Note


Critical Care Time?:  No





Stability


Stability form required:  Yes


Unstable for transfer:  Telemetry monitoring (Telemetry monitoring required), ED

Physician Assesment (Clinical assesment)





Heart Score


Heart Score:  








Heart Score Response (Comments) Value


 


History Moderate Suspicious 1


 


EKG Normal 0


 


Age >65 2


 


Risk Factors                            1 or 2 risk factors 1


 


Troponin Normal limit 0


 


Total  4

















EAMON GARCIA MD              Feb 11, 2025 13:21

## 2025-02-11 NOTE — [facility]
XY CHEST TWO VIEWS ROUTINE



CLINICAL HISTORY: cp



COMPARISON: None



TECHNIQUE: Frontal and lateral view of the chest was obtained



FINDINGS: 



Lines and Tubes: None



Lungs: No focal consolidation.



Pleura: No effusion. No pneumothorax.



Cardiomediastinal contours: Unremarkable



Bones: No acute osseous abnormality.



IMPRESSION: 



No acute cardiopulmonary disease.



Electronically Signed by: Elizabeth Valenzuela at 02/11/2025 12:55:33 PM

## 2025-02-11 NOTE — ECG
[facility]

                                       

Test Date:    2025               Test Time:    13:15:25

Pat Name:     IZABELA NEVAREZ            Department:   ER

Patient ID:   [id number]           Room:          

Gender:       M                        Technician:   STEPHY

:          1955               Requested By: ROYAL LANDIS

Order Number: [id number]        Reading MD:   Rocky Potter

                                 Measurements

Intervals                              Axis          

Rate:         67                       P:            63

IN:           181                      QRS:          -47

QRSD:         89                       T:            68

QT:           387                                    

QTc:          409                                    

                           Interpretive Statements

Sinus rhythm

Left anterior fascicular block

Abnormal R-wave progression, early transition

Electronically Signed On 2025 11:57:07 PST by Rocky Potter



Please click the below link to view image of tracing.

## 2025-02-11 NOTE — ECG
[facility]

                                       

Test Date:    2025               Test Time:    12:24:11

Pat Name:     IZABELA NEVAREZ            Department:   ER

Patient ID:   [id number]           Room:          

Gender:       M                        Technician:   HEIKE

:          1955               Requested By: ROYAL LANDIS

Order Number: [id number]        Reading MD:   Rocky Potter

                                 Measurements

Intervals                              Axis          

Rate:         66                       P:            65

MT:           179                      QRS:          -37

QRSD:         95                       T:            61

QT:           389                                    

QTc:          408                                    

                           Interpretive Statements

Sinus rhythm

Left axis deviation

Electronically Signed On 2025 11:57:01 PST by Rocky Potter



Please click the below link to view image of tracing.